# Patient Record
(demographics unavailable — no encounter records)

---

## 2024-11-14 NOTE — ASSESSMENT
[FreeTextEntry1] : L TFCC wrist injection was performed because of pain and inflammation Anesthesia: ethyl chloride sprayed topically Celestone 6mg: An injection of Celestone 1cc Lidocaine: An injection of Lidocaine 1% 1cc Marcaine: An injection of Marcaine 0.5% 1cc     The risks, benefits, and alternatives to cortisone injection were explained in full to the patient. Risks outlined include but are not limited to infection, sepsis, bleeding, scarring, skin discoloration, temporary increase in pain, syncopal episode, failure to resolve symptoms, allergic reaction, symptom recurrence, and elevation of blood sugar in diabetics. Patient understood the risks. All questions were answered. After discussion of options, patient verbally consented to an injection. Sterile prep was done of the injection site. Patient tolerated the procedure well. Advised to ice the injection site this evening.    Therapy Activity modification as tolerated Ice as needed Return in 4 weeks

## 2024-11-14 NOTE — HISTORY OF PRESENT ILLNESS
[3] : 3 [Dull/Aching] : dull/aching [de-identified] : She was sent for L wrist MRI in Aug, then had injury to low back  L wrist still painful  I independently reviewed the MRI and my interpretation is TFCC partial tear; ECU tendonitis; healed DR chaparro [FreeTextEntry1] : L wrist

## 2024-11-14 NOTE — PHYSICAL EXAM
[de-identified] : L wrist: Swelling Tender TFCC Pain with pronation/supination Decreased wrist ROM +TFCC grind Nontender DR

## 2024-12-12 NOTE — HISTORY OF PRESENT ILLNESS
[3] : 3 [Dull/Aching] : dull/aching [de-identified] : L wrist TFCC injection did not help last visit Therapy no relief  She now has painful mass at the base of the thumb  Increasing in size recently  [7] : 7 [] : yes [FreeTextEntry1] : L wrist [de-identified] : inj, therapy

## 2024-12-12 NOTE — PHYSICAL EXAM
[de-identified] : L wrist: Swelling Tender TFCC Pain with pronation/supination Decreased wrist ROM +TFCC grind Nontender DR Tender palmar mass about 1cm in size at palmar MCP Nontender A1, no trigger

## 2024-12-12 NOTE — ASSESSMENT
[FreeTextEntry1] : MRI L thumb to eval mass Wrist brace as needed Return after MRI  I would consider TFCC debridement and exc mass at the same time- but need to see mass on MRI prior Return after MRI